# Patient Record
Sex: FEMALE | Race: WHITE | NOT HISPANIC OR LATINO | Employment: UNEMPLOYED | URBAN - METROPOLITAN AREA
[De-identification: names, ages, dates, MRNs, and addresses within clinical notes are randomized per-mention and may not be internally consistent; named-entity substitution may affect disease eponyms.]

---

## 2017-11-07 ENCOUNTER — TRANSCRIBE ORDERS (OUTPATIENT)
Dept: MAMMOGRAPHY | Facility: HOSPITAL | Age: 47
End: 2017-11-07

## 2017-11-07 DIAGNOSIS — Z12.31 VISIT FOR SCREENING MAMMOGRAM: Primary | ICD-10-CM

## 2017-11-09 ENCOUNTER — TRANSCRIBE ORDERS (OUTPATIENT)
Dept: ADMINISTRATIVE | Facility: HOSPITAL | Age: 47
End: 2017-11-09

## 2017-12-22 ENCOUNTER — TRANSCRIBE ORDERS (OUTPATIENT)
Dept: MAMMOGRAPHY | Facility: HOSPITAL | Age: 47
End: 2017-12-22

## 2017-12-22 DIAGNOSIS — Z12.31 VISIT FOR SCREENING MAMMOGRAM: Primary | ICD-10-CM

## 2018-01-11 ENCOUNTER — APPOINTMENT (OUTPATIENT)
Dept: MAMMOGRAPHY | Facility: HOSPITAL | Age: 48
End: 2018-01-11
Attending: OBSTETRICS & GYNECOLOGY

## 2018-02-09 ENCOUNTER — TRANSCRIBE ORDERS (OUTPATIENT)
Dept: MAMMOGRAPHY | Facility: HOSPITAL | Age: 48
End: 2018-02-09

## 2018-02-09 DIAGNOSIS — Z12.31 VISIT FOR SCREENING MAMMOGRAM: Primary | ICD-10-CM

## 2018-03-01 ENCOUNTER — HOSPITAL ENCOUNTER (OUTPATIENT)
Dept: MAMMOGRAPHY | Facility: HOSPITAL | Age: 48
Discharge: HOME | End: 2018-03-01
Attending: OBSTETRICS & GYNECOLOGY | Admitting: OBSTETRICS & GYNECOLOGY

## 2018-03-01 DIAGNOSIS — Z12.31 VISIT FOR SCREENING MAMMOGRAM: ICD-10-CM

## 2018-03-01 PROCEDURE — 77067 SCR MAMMO BI INCL CAD: CPT

## 2018-03-01 PROCEDURE — 77063 BREAST TOMOSYNTHESIS BI: CPT

## 2018-03-01 PROCEDURE — 77067 SCR MAMMO BI INCL CAD: CPT | Performed by: RADIOLOGY

## 2018-03-01 PROCEDURE — 77063 BREAST TOMOSYNTHESIS BI: CPT | Performed by: RADIOLOGY

## 2019-02-15 ENCOUNTER — TRANSCRIBE ORDERS (OUTPATIENT)
Dept: MAMMOGRAPHY | Facility: HOSPITAL | Age: 49
End: 2019-02-15

## 2019-02-15 DIAGNOSIS — Z12.31 VISIT FOR SCREENING MAMMOGRAM: Primary | ICD-10-CM

## 2019-03-07 ENCOUNTER — APPOINTMENT (OUTPATIENT)
Dept: MAMMOGRAPHY | Facility: HOSPITAL | Age: 49
End: 2019-03-07

## 2019-04-05 ENCOUNTER — APPOINTMENT (OUTPATIENT)
Dept: MAMMOGRAPHY | Facility: HOSPITAL | Age: 49
End: 2019-04-05

## 2019-04-23 ENCOUNTER — APPOINTMENT (OUTPATIENT)
Dept: MAMMOGRAPHY | Facility: HOSPITAL | Age: 49
End: 2019-04-23

## 2019-05-16 ENCOUNTER — APPOINTMENT (OUTPATIENT)
Dept: MAMMOGRAPHY | Facility: HOSPITAL | Age: 49
End: 2019-05-16

## 2019-05-17 ENCOUNTER — TRANSCRIBE ORDERS (OUTPATIENT)
Dept: MAMMOGRAPHY | Facility: HOSPITAL | Age: 49
End: 2019-05-17

## 2019-05-17 DIAGNOSIS — Z12.31 VISIT FOR SCREENING MAMMOGRAM: Primary | ICD-10-CM

## 2019-06-03 ENCOUNTER — HOSPITAL ENCOUNTER (OUTPATIENT)
Dept: MAMMOGRAPHY | Facility: HOSPITAL | Age: 49
Discharge: HOME | End: 2019-06-03
Admitting: OBSTETRICS & GYNECOLOGY

## 2019-06-03 DIAGNOSIS — Z12.31 VISIT FOR SCREENING MAMMOGRAM: ICD-10-CM

## 2019-06-03 PROCEDURE — 77067 SCR MAMMO BI INCL CAD: CPT | Performed by: RADIOLOGY

## 2019-06-03 PROCEDURE — 77063 BREAST TOMOSYNTHESIS BI: CPT | Performed by: RADIOLOGY

## 2019-06-03 PROCEDURE — 77063 BREAST TOMOSYNTHESIS BI: CPT

## 2019-06-03 PROCEDURE — 77067 SCR MAMMO BI INCL CAD: CPT

## 2020-07-13 ENCOUNTER — TRANSCRIBE ORDERS (OUTPATIENT)
Dept: MAMMOGRAPHY | Facility: HOSPITAL | Age: 50
End: 2020-07-13

## 2020-07-13 DIAGNOSIS — Z12.31 VISIT FOR SCREENING MAMMOGRAM: Primary | ICD-10-CM

## 2020-07-30 ENCOUNTER — HOSPITAL ENCOUNTER (EMERGENCY)
Facility: HOSPITAL | Age: 50
Discharge: HOME | End: 2020-07-30
Attending: EMERGENCY MEDICINE | Admitting: EMERGENCY MEDICINE

## 2020-07-30 ENCOUNTER — APPOINTMENT (OUTPATIENT)
Dept: CT IMAGING | Facility: HOSPITAL | Age: 50
End: 2020-07-30

## 2020-07-30 VITALS
SYSTOLIC BLOOD PRESSURE: 121 MMHG | HEIGHT: 62 IN | OXYGEN SATURATION: 99 % | BODY MASS INDEX: 32.2 KG/M2 | RESPIRATION RATE: 16 BRPM | WEIGHT: 175 LBS | HEART RATE: 84 BPM | DIASTOLIC BLOOD PRESSURE: 83 MMHG | TEMPERATURE: 97.6 F

## 2020-07-30 DIAGNOSIS — R19.03 ABDOMINAL MASS, RLQ (RIGHT LOWER QUADRANT): Primary | ICD-10-CM

## 2020-07-30 DIAGNOSIS — I10 ELEVATED BLOOD PRESSURE READING WITH DIAGNOSIS OF HYPERTENSION: ICD-10-CM

## 2020-07-30 DIAGNOSIS — D17.5 LIPOMA OF COLON: ICD-10-CM

## 2020-07-30 LAB
ALBUMIN SERPL-MCNC: 4.8 G/DL (ref 3.5–5.2)
ALBUMIN/GLOB SERPL: 1.4 G/DL
ALP SERPL-CCNC: 61 U/L (ref 39–117)
ALT SERPL W P-5'-P-CCNC: 28 U/L (ref 1–33)
ANION GAP SERPL CALCULATED.3IONS-SCNC: 13 MMOL/L (ref 5–15)
AST SERPL-CCNC: 30 U/L (ref 1–32)
BACTERIA UR QL AUTO: ABNORMAL /HPF
BASOPHILS # BLD AUTO: 0.03 10*3/MM3 (ref 0–0.2)
BASOPHILS NFR BLD AUTO: 0.3 % (ref 0–1.5)
BILIRUB SERPL-MCNC: 0.5 MG/DL (ref 0–1.2)
BILIRUB UR QL STRIP: NEGATIVE
BUN SERPL-MCNC: 8 MG/DL (ref 6–20)
BUN/CREAT SERPL: 8.6 (ref 7–25)
CALCIUM SPEC-SCNC: 9.6 MG/DL (ref 8.6–10.5)
CHLORIDE SERPL-SCNC: 100 MMOL/L (ref 98–107)
CLARITY UR: CLEAR
CO2 SERPL-SCNC: 23 MMOL/L (ref 22–29)
COLOR UR: YELLOW
CREAT SERPL-MCNC: 0.93 MG/DL (ref 0.57–1)
DEPRECATED RDW RBC AUTO: 42.2 FL (ref 37–54)
EOSINOPHIL # BLD AUTO: 0.17 10*3/MM3 (ref 0–0.4)
EOSINOPHIL NFR BLD AUTO: 1.8 % (ref 0.3–6.2)
ERYTHROCYTE [DISTWIDTH] IN BLOOD BY AUTOMATED COUNT: 12.7 % (ref 12.3–15.4)
GFR SERPL CREATININE-BSD FRML MDRD: 65 ML/MIN/1.73
GLOBULIN UR ELPH-MCNC: 3.4 GM/DL
GLUCOSE SERPL-MCNC: 93 MG/DL (ref 65–99)
GLUCOSE UR STRIP-MCNC: NEGATIVE MG/DL
HCT VFR BLD AUTO: 46.2 % (ref 34–46.6)
HGB BLD-MCNC: 15.3 G/DL (ref 12–15.9)
HGB UR QL STRIP.AUTO: NEGATIVE
HOLD SPECIMEN: NORMAL
HOLD SPECIMEN: NORMAL
HYALINE CASTS UR QL AUTO: ABNORMAL /LPF
IMM GRANULOCYTES # BLD AUTO: 0.02 10*3/MM3 (ref 0–0.05)
IMM GRANULOCYTES NFR BLD AUTO: 0.2 % (ref 0–0.5)
KETONES UR QL STRIP: ABNORMAL
LEUKOCYTE ESTERASE UR QL STRIP.AUTO: ABNORMAL
LIPASE SERPL-CCNC: 20 U/L (ref 13–60)
LYMPHOCYTES # BLD AUTO: 2.62 10*3/MM3 (ref 0.7–3.1)
LYMPHOCYTES NFR BLD AUTO: 28.1 % (ref 19.6–45.3)
MCH RBC QN AUTO: 29.9 PG (ref 26.6–33)
MCHC RBC AUTO-ENTMCNC: 33.1 G/DL (ref 31.5–35.7)
MCV RBC AUTO: 90.4 FL (ref 79–97)
MONOCYTES # BLD AUTO: 0.45 10*3/MM3 (ref 0.1–0.9)
MONOCYTES NFR BLD AUTO: 4.8 % (ref 5–12)
NEUTROPHILS NFR BLD AUTO: 6.03 10*3/MM3 (ref 1.7–7)
NEUTROPHILS NFR BLD AUTO: 64.8 % (ref 42.7–76)
NITRITE UR QL STRIP: NEGATIVE
NRBC BLD AUTO-RTO: 0 /100 WBC (ref 0–0.2)
PH UR STRIP.AUTO: 6 [PH] (ref 5–8)
PLATELET # BLD AUTO: 329 10*3/MM3 (ref 140–450)
PMV BLD AUTO: 10.5 FL (ref 6–12)
POTASSIUM SERPL-SCNC: 4.2 MMOL/L (ref 3.5–5.2)
PROT SERPL-MCNC: 8.2 G/DL (ref 6–8.5)
PROT UR QL STRIP: NEGATIVE
RBC # BLD AUTO: 5.11 10*6/MM3 (ref 3.77–5.28)
RBC # UR: ABNORMAL /HPF
REF LAB TEST METHOD: ABNORMAL
SODIUM SERPL-SCNC: 136 MMOL/L (ref 136–145)
SP GR UR STRIP: <=1.005 (ref 1–1.03)
SQUAMOUS #/AREA URNS HPF: ABNORMAL /HPF
UROBILINOGEN UR QL STRIP: ABNORMAL
WBC # BLD AUTO: 9.32 10*3/MM3 (ref 3.4–10.8)
WBC UR QL AUTO: ABNORMAL /HPF
WHOLE BLOOD HOLD SPECIMEN: NORMAL
WHOLE BLOOD HOLD SPECIMEN: NORMAL

## 2020-07-30 PROCEDURE — 99284 EMERGENCY DEPT VISIT MOD MDM: CPT

## 2020-07-30 PROCEDURE — 81001 URINALYSIS AUTO W/SCOPE: CPT | Performed by: EMERGENCY MEDICINE

## 2020-07-30 PROCEDURE — 74176 CT ABD & PELVIS W/O CONTRAST: CPT

## 2020-07-30 PROCEDURE — 85025 COMPLETE CBC W/AUTO DIFF WBC: CPT

## 2020-07-30 PROCEDURE — 25010000002 ONDANSETRON PER 1 MG: Performed by: EMERGENCY MEDICINE

## 2020-07-30 PROCEDURE — 83690 ASSAY OF LIPASE: CPT

## 2020-07-30 PROCEDURE — 96374 THER/PROPH/DIAG INJ IV PUSH: CPT

## 2020-07-30 PROCEDURE — 25010000002 KETOROLAC TROMETHAMINE PER 15 MG: Performed by: EMERGENCY MEDICINE

## 2020-07-30 PROCEDURE — 80053 COMPREHEN METABOLIC PANEL: CPT

## 2020-07-30 PROCEDURE — 96375 TX/PRO/DX INJ NEW DRUG ADDON: CPT

## 2020-07-30 RX ORDER — ONDANSETRON 2 MG/ML
4 INJECTION INTRAMUSCULAR; INTRAVENOUS ONCE
Status: COMPLETED | OUTPATIENT
Start: 2020-07-30 | End: 2020-07-30

## 2020-07-30 RX ORDER — KETOROLAC TROMETHAMINE 10 MG/1
10 TABLET, FILM COATED ORAL EVERY 6 HOURS PRN
Qty: 20 TABLET | Refills: 0 | Status: SHIPPED | OUTPATIENT
Start: 2020-07-30 | End: 2022-05-04

## 2020-07-30 RX ORDER — DICYCLOMINE HYDROCHLORIDE 10 MG/1
20 CAPSULE ORAL ONCE
Status: COMPLETED | OUTPATIENT
Start: 2020-07-30 | End: 2020-07-30

## 2020-07-30 RX ORDER — MORPHINE SULFATE 4 MG/ML
4 INJECTION, SOLUTION INTRAMUSCULAR; INTRAVENOUS ONCE
Status: DISCONTINUED | OUTPATIENT
Start: 2020-07-30 | End: 2020-07-30

## 2020-07-30 RX ORDER — SODIUM CHLORIDE 0.9 % (FLUSH) 0.9 %
10 SYRINGE (ML) INJECTION AS NEEDED
Status: DISCONTINUED | OUTPATIENT
Start: 2020-07-30 | End: 2020-07-30 | Stop reason: HOSPADM

## 2020-07-30 RX ORDER — KETOROLAC TROMETHAMINE 15 MG/ML
15 INJECTION, SOLUTION INTRAMUSCULAR; INTRAVENOUS ONCE
Status: COMPLETED | OUTPATIENT
Start: 2020-07-30 | End: 2020-07-30

## 2020-07-30 RX ADMIN — DICYCLOMINE HYDROCHLORIDE 20 MG: 10 CAPSULE ORAL at 15:15

## 2020-07-30 RX ADMIN — SODIUM CHLORIDE, POTASSIUM CHLORIDE, SODIUM LACTATE AND CALCIUM CHLORIDE 1000 ML: 600; 310; 30; 20 INJECTION, SOLUTION INTRAVENOUS at 15:19

## 2020-07-30 RX ADMIN — KETOROLAC TROMETHAMINE 15 MG: 15 INJECTION, SOLUTION INTRAMUSCULAR; INTRAVENOUS at 15:11

## 2020-07-30 RX ADMIN — ONDANSETRON 4 MG: 2 INJECTION INTRAMUSCULAR; INTRAVENOUS at 15:12

## 2020-07-31 ENCOUNTER — TELEPHONE (OUTPATIENT)
Dept: GASTROENTEROLOGY | Facility: CLINIC | Age: 50
End: 2020-07-31

## 2020-07-31 NOTE — TELEPHONE ENCOUNTER
Patient was seen in the ED last night and called today to schedule a follow-up.  Your first available was for August 24.  Can you please review her records and advise if she needs to be seen sooner?  Thanks!

## 2020-10-12 ENCOUNTER — OFFICE VISIT (OUTPATIENT)
Dept: GASTROENTEROLOGY | Facility: CLINIC | Age: 50
End: 2020-10-12

## 2020-10-12 VITALS
DIASTOLIC BLOOD PRESSURE: 80 MMHG | WEIGHT: 183.4 LBS | OXYGEN SATURATION: 98 % | BODY MASS INDEX: 33.75 KG/M2 | SYSTOLIC BLOOD PRESSURE: 112 MMHG | TEMPERATURE: 98 F | HEART RATE: 117 BPM | HEIGHT: 62 IN

## 2020-10-12 DIAGNOSIS — K59.00 CONSTIPATION, UNSPECIFIED CONSTIPATION TYPE: ICD-10-CM

## 2020-10-12 DIAGNOSIS — R10.31 RIGHT LOWER QUADRANT ABDOMINAL PAIN: Primary | ICD-10-CM

## 2020-10-12 DIAGNOSIS — R10.11 RIGHT UPPER QUADRANT ABDOMINAL PAIN: ICD-10-CM

## 2020-10-12 DIAGNOSIS — R14.0 BLOATING: ICD-10-CM

## 2020-10-12 PROCEDURE — 99204 OFFICE O/P NEW MOD 45 MIN: CPT | Performed by: INTERNAL MEDICINE

## 2020-10-12 NOTE — PROGRESS NOTES
PCP: Sly Fitzgerald MD    Chief Complaint   Patient presents with   • Follow-up     Hosp()   Follow-up from ER visit due to abdominal pain.    History of Present Illness:   HPI  Mrs. Purcell is a 49-year-old who presents for evaluation of abdominal pain.  The patient states that she has a history of Hashimoto's thyroid disease.  She has been on an anti-inflammatory diet for quite some time usually the Paleo diet.  She does admit that there are some foods that seem to cause her to have some abdominal discomfort.  However, she has experienced some pain in the right lower aspect that sometimes can involve the right middle abdomen.  She has felt in the past that it had characteristics of an ovarian cyst.  She had an EGD and colonoscopy performed by Dr. Marvin in [place], [place] about 2 years ago.  There were no abnormalities found at that time.  Mrs. Purcell describes the pain is been present several days a week.  The character can be crampy but sometimes sharp.  She denies any nausea or vomiting.  There is no history of unexplained weight loss.  Mrs. Purcell denies any blood in the stool.  There is no history of night sweats, fever or chills.  The patient denies any heartburn.  There is no history of difficult or painful swallowing.  Mrs. Purcell states that sometimes the discomfort can increase with certain positions or movement.  Past Medical History:   Diagnosis Date   • Hashimoto's disease        Past Surgical History:   Procedure Laterality Date   • COLONOSCOPY     • UPPER GASTROINTESTINAL ENDOSCOPY           Current Outpatient Medications:   •  hyoscyamine (LEVSIN) 0.125 MG SL tablet, Take 1 tablet by mouth Every 4 (Four) Hours As Needed for Cramping., Disp: 20 tablet, Rfl: 0  •  ketorolac (TORADOL) 10 MG tablet, Take 1 tablet by mouth Every 6 (Six) Hours As Needed for Moderate Pain . Received a dose in the ER., Disp: 20 tablet, Rfl: 0    Allergies   Allergen Reactions   • Penicillins Provider Review  Needed   • Sulfa Antibiotics Rash       Family History   Problem Relation Age of Onset   • Breast cancer Neg Hx    • Endometrial cancer Neg Hx    • Ovarian cancer Neg Hx        Social History     Socioeconomic History   • Marital status:      Spouse name: Not on file   • Number of children: Not on file   • Years of education: Not on file   • Highest education level: Not on file   Tobacco Use   • Smoking status: Never Smoker   Substance and Sexual Activity   • Alcohol use: Never     Frequency: Never   • Drug use: Never       Review of Systems   Constitutional: Negative for activity change, appetite change, fatigue, fever and unexpected weight change.   HENT: Negative for dental problem, hearing loss, mouth sores, postnasal drip, sneezing, trouble swallowing and voice change.    Eyes: Negative for pain, redness, itching and visual disturbance.   Respiratory: Negative for cough, choking, chest tightness, shortness of breath and wheezing.    Cardiovascular: Negative for chest pain, palpitations and leg swelling.   Gastrointestinal: Positive for abdominal distention (BLOATING), abdominal pain and constipation. Negative for anal bleeding, blood in stool, diarrhea, nausea, rectal pain and vomiting.   Endocrine: Negative for cold intolerance, heat intolerance, polydipsia, polyphagia and polyuria.   Genitourinary: Negative.  Negative for dysuria, enuresis, flank pain, hematuria and urgency.   Musculoskeletal: Negative for arthralgias, back pain, gait problem, joint swelling and myalgias.   Skin: Negative for color change, pallor and rash.   Allergic/Immunologic: Positive for food allergies. Negative for environmental allergies and immunocompromised state.   Neurological: Negative for dizziness, tremors, seizures, facial asymmetry, speech difficulty, numbness and headaches.   Hematological: Negative for adenopathy.   Psychiatric/Behavioral: Negative for behavioral problems, confusion, dysphoric mood, hallucinations and  self-injury.       Vitals:    10/12/20 1531   BP: 112/80   Pulse: 117   Temp: 98 °F (36.7 °C)   SpO2: 98%       Physical Exam  Vitals signs reviewed.   Constitutional:       General: She is not in acute distress.     Appearance: Normal appearance.   HENT:      Head: Normocephalic and atraumatic.      Nose: Nose normal.      Mouth/Throat:      Mouth: Mucous membranes are moist.      Pharynx: No oropharyngeal exudate.   Eyes:      General: No scleral icterus.     Extraocular Movements: Extraocular movements intact.   Neck:      Musculoskeletal: Normal range of motion. No neck rigidity.   Cardiovascular:      Rate and Rhythm: Normal rate and regular rhythm.      Heart sounds: No murmur. No gallop.    Pulmonary:      Effort: Pulmonary effort is normal.      Breath sounds: Normal breath sounds. No wheezing or rales.   Abdominal:      General: Abdomen is flat. Bowel sounds are normal.      Tenderness: There is abdominal tenderness (right lower quadrant). There is no guarding.   Musculoskeletal: Normal range of motion.         General: No swelling or tenderness.   Skin:     General: Skin is warm and dry.      Coloration: Skin is not jaundiced.   Neurological:      General: No focal deficit present.      Mental Status: She is alert and oriented to person, place, and time.      Motor: No weakness.   Psychiatric:         Mood and Affect: Mood normal.         Thought Content: Thought content normal.         Judgment: Judgment normal.         Paulina was seen today for follow-up.    Diagnoses and all orders for this visit:    Right lower quadrant abdominal pain  -     CT Abdomen Pelvis With & Without Contrast; Future    Right upper quadrant abdominal pain  -     CT Abdomen Pelvis With & Without Contrast; Future    Bloating  -     CT Abdomen Pelvis With & Without Contrast; Future    Constipation, unspecified constipation type  -     CT Abdomen Pelvis With & Without Contrast; Future    Patient has pain with examination in the  right lower quadrant and right mid abdomen.  The etiology is unclear but could involve musculoskeletal or GI relationship..  She does have a history of an autoimmune disorder and cannot exclude other gastrointestinal autoimmune diseases.  She reports that her diet daily is gluten-free.  The records from the previous upper and lower endoscopy were not available at time of this office visit.      Plan: Will schedule a CT scan of the abdomen and pelvis with oral and IV contrast.           Will ask for a copy of the previous upper endoscopy and colonoscopy reports.           Further recommendations pending imaging study.

## 2020-10-21 ENCOUNTER — HOSPITAL ENCOUNTER (OUTPATIENT)
Dept: CT IMAGING | Facility: HOSPITAL | Age: 50
Discharge: HOME | End: 2020-10-21
Admitting: INTERNAL MEDICINE

## 2020-10-21 DIAGNOSIS — R14.0 BLOATING: ICD-10-CM

## 2020-10-21 DIAGNOSIS — R10.31 RIGHT LOWER QUADRANT ABDOMINAL PAIN: ICD-10-CM

## 2020-10-21 DIAGNOSIS — K59.00 CONSTIPATION, UNSPECIFIED CONSTIPATION TYPE: ICD-10-CM

## 2020-10-21 DIAGNOSIS — R10.11 RIGHT UPPER QUADRANT ABDOMINAL PAIN: ICD-10-CM

## 2020-10-21 LAB — CREAT BLDA-MCNC: 0.9 MG/DL (ref 0.6–1.3)

## 2020-10-21 PROCEDURE — 25010000002 IOPAMIDOL 61 % SOLUTION: Performed by: INTERNAL MEDICINE

## 2020-10-21 PROCEDURE — 74178 CT ABD&PLV WO CNTR FLWD CNTR: CPT

## 2020-10-21 PROCEDURE — 82565 ASSAY OF CREATININE: CPT

## 2020-10-21 RX ADMIN — IOPAMIDOL 100 ML: 612 INJECTION, SOLUTION INTRAVENOUS at 14:37

## 2020-10-26 ENCOUNTER — TELEPHONE (OUTPATIENT)
Dept: GASTROENTEROLOGY | Facility: CLINIC | Age: 50
End: 2020-10-26

## 2020-10-26 NOTE — TELEPHONE ENCOUNTER
----- Message from Fredrick Santos MD sent at 10/22/2020  5:07 PM EDT -----  Let Ms. Purcell know the CT scan demonstrated a lipoma in the cecum.  This is a fatty growth in the cecum.  There is no evidence for a mass in the bowel.  Thank you,  JOSHUA

## 2020-10-30 ENCOUNTER — HOSPITAL ENCOUNTER (OUTPATIENT)
Dept: MAMMOGRAPHY | Facility: HOSPITAL | Age: 50
Discharge: HOME | End: 2020-10-30
Admitting: OBSTETRICS & GYNECOLOGY

## 2020-10-30 DIAGNOSIS — Z12.31 VISIT FOR SCREENING MAMMOGRAM: ICD-10-CM

## 2020-10-30 PROCEDURE — 77063 BREAST TOMOSYNTHESIS BI: CPT

## 2020-10-30 PROCEDURE — 77067 SCR MAMMO BI INCL CAD: CPT

## 2020-10-30 PROCEDURE — 77063 BREAST TOMOSYNTHESIS BI: CPT | Performed by: RADIOLOGY

## 2020-10-30 PROCEDURE — 77067 SCR MAMMO BI INCL CAD: CPT | Performed by: RADIOLOGY

## 2021-01-15 ENCOUNTER — OFFICE VISIT (OUTPATIENT)
Dept: OBSTETRICS AND GYNECOLOGY | Facility: CLINIC | Age: 51
End: 2021-01-15

## 2021-01-15 VITALS
BODY MASS INDEX: 32.25 KG/M2 | WEIGHT: 182 LBS | DIASTOLIC BLOOD PRESSURE: 82 MMHG | SYSTOLIC BLOOD PRESSURE: 118 MMHG | HEIGHT: 63 IN

## 2021-01-15 DIAGNOSIS — R63.5 WEIGHT GAIN: ICD-10-CM

## 2021-01-15 DIAGNOSIS — Z01.419 PAP TEST, AS PART OF ROUTINE GYNECOLOGICAL EXAMINATION: Primary | ICD-10-CM

## 2021-01-15 PROCEDURE — 99396 PREV VISIT EST AGE 40-64: CPT | Performed by: NURSE PRACTITIONER

## 2021-01-15 RX ORDER — THYROID,PORK 90 MG
TABLET ORAL
COMMUNITY
Start: 2021-01-06 | End: 2022-05-04 | Stop reason: SDUPTHER

## 2021-01-15 NOTE — PROGRESS NOTES
GYN Annual Exam     CC - Here for annual exam.        HPI  Paulina Purcell is a 50 y.o. female, , who presents for annual well woman exam. Patient's last menstrual period was 2021..  Periods are regular every 25-35 days, lasting 7 days. .  Dysmenorrhea:mild, occurring throughout menses.   There were no changes to her medical or surgical history since her last visit.. Partner Status: Marital Status: .  New Partners since last visit: no.  Desires STD Screening: no. Patient states periods changing slightly and c/o weight gain in mid section.     Additional OB/GYN History   Current contraception: contraceptive methods: Vasectomy   Desires to: do not start contraception  Last Pap :   Last Completed Pap Smear       Status Date      PAP SMEAR Done 6/3/2019 negative        History of abnormal Pap smear: no  Family history of uterine, colon, breast, or ovarian cancer: no  Performs monthly Self-Breast Exam: yes  Last mammogram:   Last Completed Mammogram       Status Date      MAMMOGRAM Done 10/30/2020 MAMMO SCREENING DIGITAL TOMOSYNTHESIS BILATERAL W CAD     Patient has more history with this topic...         Exercises Regularly: no  Feelings of Anxiety or Depression: no  Tobacco Usage?: No   OB History        3    Para   2    Term   2            AB   1    Living           SAB   1    TAB        Ectopic        Molar        Multiple        Live Births                    Health Maintenance   Topic Date Due   • Annual Gynecologic Pelvic and Breast Exam  1972   • COLONOSCOPY  1972   • ANNUAL PHYSICAL  1975   • HEPATITIS C SCREENING  2020   • PAP SMEAR  2020   • INFLUENZA VACCINE  2020   • MAMMOGRAM  10/30/2021   • TDAP/TD VACCINES (3 - Td) 02/15/2025   • Pneumococcal Vaccine 0-64  Aged Out   • MENINGOCOCCAL VACCINE  Aged Out       The additional following portions of the patient's history were reviewed and updated as appropriate: allergies, current medications,  "past family history, past medical history, past social history and past surgical history.    Review of Systems   Constitutional: Positive for unexpected weight gain. Negative for fatigue and fever.   HENT: Negative.    Respiratory: Negative.    Cardiovascular: Negative.    Gastrointestinal: Negative.    Genitourinary: Negative.    Musculoskeletal: Negative.    Skin: Negative.    Allergic/Immunologic: Negative.    Neurological: Negative.    Hematological: Negative.    Psychiatric/Behavioral: Negative.      All other systems reviewed and are negative.     I have reviewed and agree with the HPI, ROS, and historical information as entered above. Ely Rios Ambrosio, APRN    Objective   /82   Ht 160 cm (63\")   Wt 82.6 kg (182 lb)   LMP 01/07/2021   BMI 32.24 kg/m²     Physical Exam  Vitals signs and nursing note reviewed. Exam conducted with a chaperone present.   Constitutional:       Appearance: She is well-developed.   HENT:      Head: Normocephalic and atraumatic.   Neck:      Musculoskeletal: Normal range of motion. No muscular tenderness.      Thyroid: No thyroid mass or thyromegaly.   Pulmonary:      Effort: Pulmonary effort is normal. No retractions.   Chest:      Chest wall: No mass.      Breasts:         Right: Normal. No mass, nipple discharge, skin change or tenderness.         Left: Normal. No mass, nipple discharge, skin change or tenderness.   Abdominal:      Palpations: Abdomen is soft. Abdomen is not rigid. There is no mass.      Tenderness: There is no abdominal tenderness. There is no guarding.      Hernia: No hernia is present. There is no hernia in the left inguinal area.   Genitourinary:     General: Normal vulva.      Labia:         Right: No rash, tenderness or lesion.         Left: No rash, tenderness or lesion.       Vagina: Normal. No vaginal discharge or lesions.      Cervix: Normal.      Uterus: Normal. Not enlarged, not fixed and not tender.       Adnexa: Right adnexa normal and left " adnexa normal.        Right: No mass or tenderness.          Left: No mass or tenderness.        Rectum: No external hemorrhoid.   Skin:     General: Skin is warm and dry.   Neurological:      Mental Status: She is alert and oriented to person, place, and time.   Psychiatric:         Mood and Affect: Mood normal.         Behavior: Behavior normal.            Assessment and Plan    Problem List Items Addressed This Visit     None      Visit Diagnoses     Pap test, as part of routine gynecological examination    -  Primary    Relevant Orders    Pap IG, Rfx HPV ASCU    Weight gain              1. Reviewed monthly self breast exams.  Instructed to call with lumps, pain, or breast discharge.    2. Recommended use of Vitamin D replacement and getting adequate calcium in her diet. (1500mg)  3. Reviewed BMI and weight loss as preventative health measures.   4. Reviewed exercise as a preventative health measures.   5. Reccommended Flu Vaccine in Fall of each year.  6. Symptoms of menopausal transition reviewed with patient.   7. RTC in 1 year or PRN with problems.    Ely Valente, APRN  01/15/2021

## 2021-01-21 DIAGNOSIS — Z01.419 PAP TEST, AS PART OF ROUTINE GYNECOLOGICAL EXAMINATION: ICD-10-CM

## 2021-02-25 ENCOUNTER — TELEPHONE (OUTPATIENT)
Dept: OBSTETRICS AND GYNECOLOGY | Facility: CLINIC | Age: 51
End: 2021-02-25

## 2021-02-25 NOTE — TELEPHONE ENCOUNTER
Notified patient pap negative with +endometrial cells-patient is having regular periods.     Per OP this is WNL and ok to repeat in 1 year. Pt vu.

## 2021-05-21 RX ORDER — FLUCONAZOLE 150 MG/1
TABLET ORAL
Qty: 2 TABLET | Refills: 0 | Status: SHIPPED | OUTPATIENT
Start: 2021-05-21 | End: 2022-05-04

## 2022-05-02 ENCOUNTER — TELEPHONE (OUTPATIENT)
Dept: FAMILY MEDICINE CLINIC | Facility: CLINIC | Age: 52
End: 2022-05-02

## 2022-05-04 ENCOUNTER — OFFICE VISIT (OUTPATIENT)
Dept: FAMILY MEDICINE CLINIC | Facility: CLINIC | Age: 52
End: 2022-05-04

## 2022-05-04 VITALS
BODY MASS INDEX: 32.34 KG/M2 | SYSTOLIC BLOOD PRESSURE: 124 MMHG | HEART RATE: 97 BPM | WEIGHT: 182.5 LBS | OXYGEN SATURATION: 98 % | DIASTOLIC BLOOD PRESSURE: 72 MMHG | HEIGHT: 63 IN

## 2022-05-04 DIAGNOSIS — E03.9 ACQUIRED HYPOTHYROIDISM: Primary | ICD-10-CM

## 2022-05-04 PROCEDURE — 99213 OFFICE O/P EST LOW 20 MIN: CPT | Performed by: PHYSICIAN ASSISTANT

## 2022-05-04 RX ORDER — THYROID,PORK 90 MG
90 TABLET ORAL DAILY
Qty: 90 TABLET | Refills: 1 | Status: SHIPPED | OUTPATIENT
Start: 2022-05-04 | End: 2022-11-03

## 2022-05-04 RX ORDER — TRETINOIN 0.6 MG/G
GEL TOPICAL
COMMUNITY
Start: 2022-04-28

## 2022-05-04 RX ORDER — IVERMECTIN 10 MG/G
CREAM TOPICAL
COMMUNITY

## 2022-05-04 NOTE — ASSESSMENT & PLAN NOTE
I will refill her medication for another 6 months and she can schedule her next visit as an annual physical.

## 2022-05-04 NOTE — PROGRESS NOTES
"Chief Complaint  Hypothyroidism    Subjective          Paulina Purcell presents to [facility]  History of Present Illness patient comes in today needing her thyroid medication refilled she has no other complaints today and is feeling fine.    Objective     Vital Signs:   /72 (BP Location: Right arm, Patient Position: Sitting, Cuff Size: Large Adult)   Pulse 97   Ht 160 cm (63\")   Wt 82.8 kg (182 lb 8 oz)   SpO2 98%   BMI 32.33 kg/m²     Body mass index is 32.33 kg/m².    Review of Systems   Constitutional: Negative.    HENT: Negative.    Respiratory: Negative.    Cardiovascular: Negative.    Gastrointestinal: Negative.    Neurological: Negative.    Psychiatric/Behavioral: Negative.        Past History:  Medical History: has a past medical history of Hashimoto's disease, mammogram (03/02/2018), Hypothyroidism (acquired), Papanicolaou smear (03/01/2018), and Screening breast examination.   Surgical History: has a past surgical history that includes Colonoscopy; Upper gastrointestinal endoscopy; BIOPSY OF BACK/FLANK (2007); Diagnostic laparoscopy; and Cholecystectomy.   Family History: family history includes Diabetes in her maternal grandfather; Stroke in her father and mother; Thyroid disease in her father.   Social History: reports that she has never smoked. She has never used smokeless tobacco. She reports that she does not drink alcohol and does not use drugs.      Current Outpatient Medications:   •  [place] Thyroid 90 MG tablet, Take 1 tablet by mouth Daily., Disp: 90 tablet, Rfl: 1  •  Ivermectin 1 % cream, Apply  topically., Disp: , Rfl:   •  Retin-A Micro Pump 0.06 % gel, , Disp: , Rfl:     Allergies: Penicillins and Sulfa antibiotics    Physical Exam  Vitals reviewed.   Constitutional:       Appearance: Normal appearance.   Cardiovascular:      Rate and Rhythm: Normal rate and regular rhythm.      Heart sounds: Normal heart sounds.   Pulmonary:      Effort: Pulmonary " effort is normal.      Breath sounds: Normal breath sounds.   Abdominal:      General: Bowel sounds are normal.      Palpations: Abdomen is soft.   Musculoskeletal:         General: Normal range of motion.   Neurological:      General: No focal deficit present.      Mental Status: She is alert and oriented to person, place, and time.   Psychiatric:         Mood and Affect: Mood normal.             Assessment and Plan   Diagnoses and all orders for this visit:    1. Acquired hypothyroidism (Primary)  Assessment & Plan:  I will refill her medication for another 6 months and she can schedule her next visit as an annual physical.       Other orders  -     [place] Thyroid 90 MG tablet; Take 1 tablet by mouth Daily.  Dispense: 90 tablet; Refill: 1          Follow Up   Return in about 6 months (around 11/4/2022) for Annual physical.  Patient was given instructions and counseling regarding her condition or for health maintenance advice. Please see specific information pulled into the AVS if appropriate.     Alyssa Arriola PA-C

## 2022-10-31 ENCOUNTER — TELEPHONE (OUTPATIENT)
Dept: FAMILY MEDICINE CLINIC | Facility: CLINIC | Age: 52
End: 2022-10-31

## 2022-11-01 NOTE — TELEPHONE ENCOUNTER
Caller: Paulina Purcell    Relationship: Self    Best call back number: [phone]     What was the call regarding: PATIENT CALLED TO CHECK THE STATUS OF HER PRESCRIPTION REFILL REQUEST.  PATIENT STATED THAT SHE IS OUT OF MEDICATION.    Do you require a callback:YES

## 2022-11-02 RX ORDER — THYROID,PORK 90 MG
90 TABLET ORAL DAILY
Qty: 90 TABLET | Refills: 1 | OUTPATIENT
Start: 2022-11-02

## 2022-11-02 NOTE — TELEPHONE ENCOUNTER
Patient was informed that her medication request was denied. She said that when she called on Monday, 10/31/22, she spoke to shena Harrison and was told that her medication would be sent to the pharmacy.  She had to rescheduled her 11/4/2022 appt due to a time conflict.  She said that she called again and was told that this would be taken care of.  She said that she has been without medication for a few days and she's not supposed to miss a day.  She would like a call back. Ph: [phone]

## 2022-11-03 RX ORDER — THYROID,PORK 90 MG
TABLET ORAL
Qty: 90 TABLET | Refills: 0 | Status: SHIPPED | OUTPATIENT
Start: 2022-11-03 | End: 2022-12-12 | Stop reason: SDUPTHER

## 2022-11-21 DIAGNOSIS — Z13.1 DIABETES MELLITUS SCREENING: ICD-10-CM

## 2022-11-21 DIAGNOSIS — Z00.00 GENERAL MEDICAL EXAM: ICD-10-CM

## 2022-11-21 DIAGNOSIS — E03.9 ACQUIRED HYPOTHYROIDISM: Primary | ICD-10-CM

## 2022-12-12 ENCOUNTER — OFFICE VISIT (OUTPATIENT)
Dept: FAMILY MEDICINE CLINIC | Facility: CLINIC | Age: 52
End: 2022-12-12

## 2022-12-12 VITALS
BODY MASS INDEX: 32.25 KG/M2 | DIASTOLIC BLOOD PRESSURE: 84 MMHG | HEIGHT: 63 IN | HEART RATE: 88 BPM | OXYGEN SATURATION: 98 % | WEIGHT: 182 LBS | SYSTOLIC BLOOD PRESSURE: 122 MMHG

## 2022-12-12 DIAGNOSIS — E03.9 HYPOTHYROIDISM (ACQUIRED): ICD-10-CM

## 2022-12-12 DIAGNOSIS — L71.9 ROSACEA: ICD-10-CM

## 2022-12-12 DIAGNOSIS — G47.00 INSOMNIA, PERSISTENT: ICD-10-CM

## 2022-12-12 DIAGNOSIS — Z00.00 GENERAL MEDICAL EXAM: Primary | ICD-10-CM

## 2022-12-12 PROBLEM — Z13.1 DIABETES MELLITUS SCREENING: Status: ACTIVE | Noted: 2022-12-12

## 2022-12-12 PROBLEM — Z13.1 DIABETES MELLITUS SCREENING: Status: RESOLVED | Noted: 2022-12-12 | Resolved: 2022-12-12

## 2022-12-12 PROBLEM — Z12.11 SCREENING FOR COLON CANCER: Status: RESOLVED | Noted: 2022-12-12 | Resolved: 2022-12-12

## 2022-12-12 PROBLEM — Z12.11 SCREENING FOR COLON CANCER: Status: ACTIVE | Noted: 2022-12-12

## 2022-12-12 PROCEDURE — 99396 PREV VISIT EST AGE 40-64: CPT | Performed by: FAMILY MEDICINE

## 2022-12-12 RX ORDER — THYROID,PORK 90 MG
90 TABLET ORAL DAILY
Qty: 90 TABLET | Refills: 3 | Status: SHIPPED | OUTPATIENT
Start: 2022-12-12

## 2022-12-12 NOTE — ASSESSMENT & PLAN NOTE
We did discuss her current over-the-counter treatments.    I encouraged her to build into her schedule exercise daily to help with her BMI and also good sleep hygiene.    We did discuss trazodone as an option for prescription treatment but she wants to hold off on prescription meds at this time

## 2022-12-12 NOTE — PROGRESS NOTES
"Due forChief Complaint  thyroid (Rx refill )    Subjective    History of Present Illness:  Paulina Purcell is a 51 y.o. female who presents today for physical exam and medication refills.    She continues on [place] Thyroid for hypothyroidism.  Failed Synthroid.  She will return to get fasting blood work up-to-date including up-to-date thyroid studies.    Up-to-date with Pap January 2021    Discussed colon cancer screening given new recommendations for colonoscopy at age 45.  She is already up-to-date with colonoscopy in 2018 without polyps.  Plans to repeat colonoscopy in 2028.    Mammogram and plans to get that up-to-date.  She prefers to have that done through her gynecologist-I did encourage getting that scheduled today.    On topical treatment from dermatology for rosacea.    She has been under a lot of stress taking care of her aging parents along with home schooling.  She has noticed some difficulties falling asleep and is using melatonin with CBD oil.  We did discuss increasing her exercise to help with her BMI and also help with good sleep hygiene and chronic insomnia.    She wants to hold off on prescription medications at this time.    Declines COVID vaccination    Will consider Tdap at follow-up.  Were currently out of Tdap boosters      Objective   Vital Signs:   /84 (BP Location: Left arm, Patient Position: Sitting, Cuff Size: Adult)   Pulse 88   Ht 160 cm (63\")   Wt 82.6 kg (182 lb)   SpO2 98%   BMI 32.24 kg/m²     Review of Systems   Constitutional: Negative for appetite change, chills and fever.   HENT: Negative for hearing loss.    Eyes: Negative for blurred vision.   Respiratory: Negative for chest tightness.    Cardiovascular: Negative for chest pain.   Gastrointestinal: Negative for abdominal pain.   Musculoskeletal: Negative for gait problem.   Skin: Negative for rash.   Psychiatric/Behavioral: Positive for sleep disturbance. Negative for depressed mood.       Past History:  Medical " History: has a past medical history of Hashimoto's disease, mammogram (03/02/2018), Hypothyroidism (acquired), Papanicolaou smear (03/01/2018), and Screening breast examination.   Surgical History: has a past surgical history that includes Colonoscopy; Upper gastrointestinal endoscopy; BIOPSY OF BACK/FLANK (2007); Diagnostic laparoscopy; and Cholecystectomy.   Family History: family history includes Diabetes in her maternal grandfather; Stroke in her father and mother; Thyroid disease in her father.   Social History: reports that she has never smoked. She has never used smokeless tobacco. She reports that she does not drink alcohol and does not use drugs.      Current Outpatient Medications:   •  [place] Thyroid 90 MG tablet, Take 1 tablet by mouth Daily., Disp: 90 tablet, Rfl: 3  •  Ivermectin 1 % cream, Apply  topically., Disp: , Rfl:   •  Retin-A Micro Pump 0.06 % gel, , Disp: , Rfl:     Allergies: Penicillins, Cefuroxime axetil, and Sulfa antibiotics    Physical Exam  Constitutional:       Appearance: Normal appearance.   HENT:      Head: Normocephalic.      Right Ear: External ear normal.      Left Ear: External ear normal.      Nose: Nose normal.   Eyes:      Pupils: Pupils are equal, round, and reactive to light.   Cardiovascular:      Rate and Rhythm: Normal rate and regular rhythm.      Heart sounds: Normal heart sounds.   Pulmonary:      Effort: Pulmonary effort is normal.      Breath sounds: Normal breath sounds.   Musculoskeletal:         General: Normal range of motion.      Cervical back: Normal range of motion.   Skin:     General: Skin is warm and dry.   Neurological:      General: No focal deficit present.      Mental Status: She is alert.   Psychiatric:         Mood and Affect: Mood normal.         Behavior: Behavior normal.         Thought Content: Thought content normal.          Result Review                   Assessment and Plan  Diagnoses and all orders for this visit:    1. General medical  exam (Primary)  Assessment & Plan:  Reviewed together health maintenance, screening, and vaccination options.    We did address new recommendations to start colon cancer screening at age 45.  She had this up-to-date in 2018.  Updated in chart.  Due for repeat in 2028.    Declines COVID vaccination    Encouraged Tdap    Awaiting return for fasting labs.    Refilled [facility] Thyroid    Encouraged past-due mammogram      2. Hypothyroidism (acquired)  Assessment & Plan:  Doing well on [place] Thyroid.  Refilled.  Awaiting recheck on fasting labs    Orders:  -     [place] Thyroid 90 MG tablet; Take 1 tablet by mouth Daily.  Dispense: 90 tablet; Refill: 3    3. Insomnia, persistent  Assessment & Plan:  We did discuss her current over-the-counter treatments.    I encouraged her to build into her schedule exercise daily to help with her BMI and also good sleep hygiene.    We did discuss trazodone as an option for prescription treatment but she wants to hold off on prescription meds at this time      4. Rosacea  Assessment & Plan:  Continue with current topical treatment from dermatology        BMI is >= 30 and <35. (Class 1 Obesity). The following options were offered after discussion;: exercise counseling/recommendations and nutrition counseling/recommendations          Follow Up  Return in about 1 year (around 12/12/2023) for Annual physical.    Sly Fitzgerald MD  Answers for HPI/ROS submitted by the patient on 12/12/2022  Please describe your symptoms.: I just need my annual labs done  Have you had these symptoms before?: Yes  How long have you been having these symptoms?: Greater than 2 weeks  Please list any medications you are currently taking for this condition.: [facility] Thyroid  What is the primary reason for your visit?: Other

## 2022-12-12 NOTE — ASSESSMENT & PLAN NOTE
Reviewed together health maintenance, screening, and vaccination options.    We did address new recommendations to start colon cancer screening at age 45.  She had this up-to-date in 2018.  Updated in chart.  Due for repeat in 2028.    Declines COVID vaccination    Encouraged Tdap    Awaiting return for fasting labs.    Refilled [place] Thyroid    Encouraged past-due mammogram

## 2023-07-27 ENCOUNTER — OFFICE VISIT (OUTPATIENT)
Dept: FAMILY MEDICINE CLINIC | Facility: CLINIC | Age: 53
End: 2023-07-27
Payer: COMMERCIAL

## 2023-07-27 VITALS
BODY MASS INDEX: 32.44 KG/M2 | WEIGHT: 183.1 LBS | SYSTOLIC BLOOD PRESSURE: 130 MMHG | OXYGEN SATURATION: 99 % | DIASTOLIC BLOOD PRESSURE: 84 MMHG | HEIGHT: 63 IN | HEART RATE: 105 BPM

## 2023-07-27 DIAGNOSIS — G47.00 INSOMNIA, PERSISTENT: ICD-10-CM

## 2023-07-27 DIAGNOSIS — Z23 NEED FOR TDAP VACCINATION: ICD-10-CM

## 2023-07-27 DIAGNOSIS — E66.09 CLASS 1 OBESITY DUE TO EXCESS CALORIES WITH SERIOUS COMORBIDITY AND BODY MASS INDEX (BMI) OF 32.0 TO 32.9 IN ADULT: ICD-10-CM

## 2023-07-27 DIAGNOSIS — Z12.31 SCREENING MAMMOGRAM FOR BREAST CANCER: ICD-10-CM

## 2023-07-27 DIAGNOSIS — Z00.00 GENERAL MEDICAL EXAM: ICD-10-CM

## 2023-07-27 DIAGNOSIS — E03.9 HYPOTHYROIDISM (ACQUIRED): Primary | Chronic | ICD-10-CM

## 2023-07-27 DIAGNOSIS — E03.9 ACQUIRED HYPOTHYROIDISM: ICD-10-CM

## 2023-07-27 DIAGNOSIS — Z13.1 DIABETES MELLITUS SCREENING: ICD-10-CM

## 2023-07-27 DIAGNOSIS — L71.9 ROSACEA: ICD-10-CM

## 2023-07-27 PROBLEM — E66.811 CLASS 1 OBESITY DUE TO EXCESS CALORIES WITH SERIOUS COMORBIDITY AND BODY MASS INDEX (BMI) OF 32.0 TO 32.9 IN ADULT: Status: ACTIVE | Noted: 2023-07-27

## 2023-07-27 RX ORDER — THYROID,PORK 90 MG
90 TABLET ORAL DAILY
Qty: 90 TABLET | Refills: 3 | Status: SHIPPED | OUTPATIENT
Start: 2023-07-27

## 2023-07-27 NOTE — ASSESSMENT & PLAN NOTE
Patient's (Body mass index is 32.43 kg/m².) indicates that they are obese (BMI >30) with health conditions that include none . Weight is unchanged. BMI  is above average; BMI management plan is completed. We discussed portion control and increasing exercise.

## 2023-07-27 NOTE — PROGRESS NOTES
"Chief Complaint  Med Refill    Subjective    History of Present Illness:  Paulina Purcell is a 52 y.o. female who presents today for 6-month follow-up visit and medication refills.    Doing well since our last visit for physical exam together in December.     She continues on [place] Thyroid for hypothyroidism.  Failed Synthroid.     Up-to-date with Pap January 2021     Colon cancer screening up-to-date with colonoscopy in 2018 without polyps.  Plans to repeat colonoscopy in 2028.     Mammogram remains past due for my records.  We did discuss this today and she would like to have this done at [facility] and she will call and get her appointment scheduled.       On topical treatment from dermatology for rosacea.     Declines COVID vaccination     Discussed Tdap and Shingrix.  Declines vaccination update today     Objective   Vital Signs:   /84   Pulse 105   Ht 160 cm (63\")   Wt 83.1 kg (183 lb 1.6 oz)   SpO2 99%   BMI 32.43 kg/m²     Review of Systems   Constitutional:  Negative for appetite change, chills and fever.   HENT:  Negative for hearing loss.    Eyes:  Negative for blurred vision.   Respiratory:  Negative for chest tightness.    Cardiovascular:  Negative for chest pain.   Gastrointestinal:  Negative for abdominal pain.   Musculoskeletal:  Negative for gait problem.   Skin:  Negative for rash.   Psychiatric/Behavioral:  Negative for depressed mood.      Past History:  Medical History: has a past medical history of Hashimoto's disease, mammogram (03/02/2018), Hypothyroidism (acquired), Papanicolaou smear (03/01/2018), and Screening breast examination.   Surgical History: has a past surgical history that includes Colonoscopy; Upper gastrointestinal endoscopy; BIOPSY OF BACK/FLANK (2007); Diagnostic laparoscopy; and Cholecystectomy.   Family History: family history includes Diabetes in her maternal grandfather; Stroke in her father and mother; Thyroid disease in her father. "   Social History: reports that she has never smoked. She has never used smokeless tobacco. She reports that she does not drink alcohol and does not use drugs.      Current Outpatient Medications:     [place] Thyroid 90 MG tablet, Take 1 tablet by mouth Daily., Disp: 90 tablet, Rfl: 3    Ivermectin 1 % cream, Apply  topically., Disp: , Rfl:     Retin-A Micro Pump 0.06 % gel, , Disp: , Rfl:     Allergies: Penicillins, Cefuroxime axetil, and Sulfa antibiotics    Physical Exam  Constitutional:       Appearance: Normal appearance.   HENT:      Head: Normocephalic.      Right Ear: External ear normal.      Left Ear: External ear normal.      Nose: Nose normal.   Eyes:      Pupils: Pupils are equal, round, and reactive to light.   Cardiovascular:      Rate and Rhythm: Normal rate and regular rhythm.      Heart sounds: Normal heart sounds.   Pulmonary:      Effort: Pulmonary effort is normal.      Breath sounds: Normal breath sounds.   Musculoskeletal:         General: Normal range of motion.      Cervical back: Normal range of motion.   Skin:     General: Skin is warm and dry.   Neurological:      General: No focal deficit present.      Mental Status: She is alert.   Psychiatric:         Mood and Affect: Mood normal.         Behavior: Behavior normal.         Thought Content: Thought content normal.        Result Review                   Assessment and Plan  Diagnoses and all orders for this visit:    1. Hypothyroidism (acquired) (Primary)  Assessment & Plan:  Doing well on [place] Thyroid.  Refilled.  Awaiting recheck on fasting labs    Orders:  -     [place] Thyroid 90 MG tablet; Take 1 tablet by mouth Daily.  Dispense: 90 tablet; Refill: 3    2. Insomnia, persistent  Assessment & Plan:  Stable control without medications at this time.  We will continue to follow      3. Rosacea  Assessment & Plan:  Continue treatment from dermatology      4. Need for Tdap vaccination  Assessment & Plan:  Encouraged Tdap.  Patient  declines      5. Screening mammogram for breast cancer  Assessment & Plan:  Encouraged screening mammogram which is past due      6. Class 1 obesity due to excess calories with serious comorbidity and body mass index (BMI) of 32.0 to 32.9 in adult  Assessment & Plan:  Patient's (Body mass index is 32.43 kg/m².) indicates that they are obese (BMI >30) with health conditions that include none . Weight is unchanged. BMI  is above average; BMI management plan is completed. We discussed portion control and increasing exercise.                     Follow Up  Return in about 1 year (around 7/27/2024) for Annual physical, Fasting for labs at appointment (but drink water!).    Sly Fitzgerald MD

## 2023-07-28 LAB
ALBUMIN SERPL-MCNC: 4.9 G/DL (ref 3.9–4.9)
ALBUMIN/GLOB SERPL: 1.8 {RATIO} (ref 1.2–2.2)
ALP SERPL-CCNC: 76 IU/L (ref 44–121)
ALT SERPL-CCNC: 23 IU/L (ref 0–32)
AST SERPL-CCNC: 28 IU/L (ref 0–40)
BASOPHILS # BLD AUTO: 0.1 X10E3/UL (ref 0–0.2)
BASOPHILS NFR BLD AUTO: 0 %
BILIRUB SERPL-MCNC: 0.5 MG/DL (ref 0–1.2)
BUN SERPL-MCNC: 11 MG/DL (ref 6–24)
BUN/CREAT SERPL: 11 (ref 9–23)
CALCIUM SERPL-MCNC: 9.7 MG/DL (ref 8.7–10.2)
CHLORIDE SERPL-SCNC: 104 MMOL/L (ref 96–106)
CHOLEST SERPL-MCNC: 211 MG/DL (ref 100–199)
CO2 SERPL-SCNC: NORMAL MMOL/L
CREAT SERPL-MCNC: 1 MG/DL (ref 0.57–1)
EGFRCR SERPLBLD CKD-EPI 2021: 69 ML/MIN/1.73
EOSINOPHIL # BLD AUTO: 0.6 X10E3/UL (ref 0–0.4)
EOSINOPHIL NFR BLD AUTO: 5 %
ERYTHROCYTE [DISTWIDTH] IN BLOOD BY AUTOMATED COUNT: 13.5 % (ref 11.7–15.4)
GLOBULIN SER CALC-MCNC: 2.8 G/DL (ref 1.5–4.5)
GLUCOSE SERPL-MCNC: 95 MG/DL (ref 70–99)
HBA1C MFR BLD: 5.2 % (ref 4.8–5.6)
HCT VFR BLD AUTO: 49.8 % (ref 34–46.6)
HDLC SERPL-MCNC: 63 MG/DL
HGB BLD-MCNC: 16.2 G/DL (ref 11.1–15.9)
IMM GRANULOCYTES # BLD AUTO: 0 X10E3/UL (ref 0–0.1)
IMM GRANULOCYTES NFR BLD AUTO: 0 %
LDLC SERPL CALC-MCNC: 125 MG/DL (ref 0–99)
LYMPHOCYTES # BLD AUTO: 2.6 X10E3/UL (ref 0.7–3.1)
LYMPHOCYTES NFR BLD AUTO: 22 %
MCH RBC QN AUTO: 30 PG (ref 26.6–33)
MCHC RBC AUTO-ENTMCNC: 32.5 G/DL (ref 31.5–35.7)
MCV RBC AUTO: 92 FL (ref 79–97)
MONOCYTES # BLD AUTO: 0.5 X10E3/UL (ref 0.1–0.9)
MONOCYTES NFR BLD AUTO: 4 %
NEUTROPHILS # BLD AUTO: 7.7 X10E3/UL (ref 1.4–7)
NEUTROPHILS NFR BLD AUTO: 69 %
PLATELET # BLD AUTO: 294 X10E3/UL (ref 150–450)
POTASSIUM SERPL-SCNC: 4.8 MMOL/L (ref 3.5–5.2)
PROT SERPL-MCNC: 7.7 G/DL (ref 6–8.5)
RBC # BLD AUTO: 5.4 X10E6/UL (ref 3.77–5.28)
SODIUM SERPL-SCNC: 137 MMOL/L (ref 134–144)
T4 FREE SERPL-MCNC: 1 NG/DL (ref 0.82–1.77)
TRIGL SERPL-MCNC: 128 MG/DL (ref 0–149)
TSH SERPL DL<=0.005 MIU/L-ACNC: 0.63 UIU/ML (ref 0.45–4.5)
VLDLC SERPL CALC-MCNC: 23 MG/DL (ref 5–40)
WBC # BLD AUTO: 11.5 X10E3/UL (ref 3.4–10.8)

## 2024-07-30 ENCOUNTER — TELEPHONE (OUTPATIENT)
Dept: FAMILY MEDICINE CLINIC | Facility: CLINIC | Age: 54
End: 2024-07-30

## 2024-07-30 NOTE — TELEPHONE ENCOUNTER
Caller: Paulina Purcell    Relationship: Self    Best call back number: [phone]        What orders are you requesting (i.e. lab or imaging): LABS FOR PHYSICAL (TO INCLUDE CHECKING THYROID LEVELS)     In what timeframe would the patient need to come in: PRIOR TO UPCOMING PHYSICAL ON 10/07/2024 WITH [facility]     Where will you receive your lab/imaging services: AT THE PRACTICE     Additional notes: PLEASE CALL THE PATIENT AND ADVISE WHEN LABS ARE IN THE SYSTEM.

## 2024-07-30 NOTE — TELEPHONE ENCOUNTER
JUST AN FYI THAT THE PATIENT HAS AN APPOINTMENT FOR HER ANNUAL PHYSICAL WITH [facility] OF DR CEDILLO DUE TO AVAILABILITY OF APPOINTMENTS IN THE PATIENT'S PREFERRED TIME FRAME (THIS NOTE REQUIRED PER HUB WORKFLOW).

## 2024-07-31 DIAGNOSIS — E03.9 HYPOTHYROIDISM (ACQUIRED): Chronic | ICD-10-CM

## 2024-07-31 RX ORDER — THYROID,PORK 90 MG
90 TABLET ORAL DAILY
Qty: 90 TABLET | Refills: 0 | Status: SHIPPED | OUTPATIENT
Start: 2024-07-31

## 2024-08-07 ENCOUNTER — OFFICE VISIT (OUTPATIENT)
Dept: FAMILY MEDICINE CLINIC | Facility: CLINIC | Age: 54
End: 2024-08-07
Payer: COMMERCIAL

## 2024-08-07 VITALS
DIASTOLIC BLOOD PRESSURE: 76 MMHG | BODY MASS INDEX: 32.48 KG/M2 | TEMPERATURE: 97.9 F | HEART RATE: 83 BPM | SYSTOLIC BLOOD PRESSURE: 118 MMHG | HEIGHT: 63 IN | WEIGHT: 183.3 LBS | OXYGEN SATURATION: 98 %

## 2024-08-07 DIAGNOSIS — E03.9 HYPOTHYROIDISM (ACQUIRED): Chronic | ICD-10-CM

## 2024-08-07 DIAGNOSIS — J20.9 ACUTE BRONCHITIS WITH BRONCHOSPASM: Primary | ICD-10-CM

## 2024-08-07 PROCEDURE — 99214 OFFICE O/P EST MOD 30 MIN: CPT | Performed by: PHYSICIAN ASSISTANT

## 2024-08-07 RX ORDER — THYROID,PORK 90 MG
90 TABLET ORAL DAILY
Qty: 90 TABLET | Refills: 1 | Status: SHIPPED | OUTPATIENT
Start: 2024-08-07

## 2024-08-07 RX ORDER — DOXYCYCLINE 100 MG/1
100 CAPSULE ORAL 2 TIMES DAILY
Qty: 20 CAPSULE | Refills: 0 | Status: SHIPPED | OUTPATIENT
Start: 2024-08-07

## 2024-08-07 RX ORDER — METHYLPREDNISOLONE 4 MG/1
TABLET ORAL
Qty: 21 TABLET | Refills: 0 | Status: SHIPPED | OUTPATIENT
Start: 2024-08-07

## 2024-08-07 RX ORDER — ALBUTEROL SULFATE 90 UG/1
AEROSOL, METERED RESPIRATORY (INHALATION)
COMMUNITY
Start: 2024-07-20

## 2024-08-07 RX ORDER — BENZONATATE 200 MG/1
200 CAPSULE ORAL 3 TIMES DAILY PRN
Qty: 30 CAPSULE | Refills: 1 | Status: SHIPPED | OUTPATIENT
Start: 2024-08-07

## 2024-08-07 NOTE — PROGRESS NOTES
"Chief Complaint  Cough (X1.5 months) and Hypothyroidism (Pt needs a refill on her med)    Subjective          Paulina Purcell presents to [facility]    History of Present Illness patient is here today for a cough that has been going on for about a month and a half and she has been to urgent care and the [facility] clinic and they have given her a steroid pack which helped a little bit some Tessalon Perles that were helpful for the cough and an inhaler.  Nothing really seems to get rid of this \"bronchitis.\"  She also needs her thyroid medication refilled while she is here today.    Objective   Vital Signs:   /76 (BP Location: Right arm, Patient Position: Sitting, Cuff Size: Adult)   Pulse 83   Temp 97.9 °F (36.6 °C)   Ht 160 cm (63\")   Wt 83.1 kg (183 lb 4.8 oz)   SpO2 98%   BMI 32.47 kg/m²     Body mass index is 32.47 kg/m².    Review of Systems   Constitutional:  Negative for chills, fatigue and fever.   HENT:  Positive for congestion and postnasal drip. Negative for ear pain, rhinorrhea, sinus pressure and sore throat.    Respiratory:  Positive for cough and wheezing. Negative for chest tightness and shortness of breath.    Cardiovascular:  Negative for chest pain and palpitations.   Musculoskeletal:  Negative for back pain and myalgias.   Neurological:  Negative for dizziness and headache.   Psychiatric/Behavioral:  Negative for depressed mood. The patient is not nervous/anxious.        Past History:  Medical History: has a past medical history of Hashimoto's disease, mammogram (03/02/2018), Hypothyroidism (acquired), Papanicolaou smear (03/01/2018), and Screening breast examination.   Surgical History: has a past surgical history that includes Colonoscopy; Upper gastrointestinal endoscopy; BIOPSY OF BACK/FLANK (2007); Diagnostic laparoscopy; and Cholecystectomy.   Family History: family history includes Diabetes in her maternal grandfather; Stroke in her father and mother; " Thyroid disease in her father.   Social History: reports that she has never smoked. She has never used smokeless tobacco. She reports that she does not drink alcohol and does not use drugs.      Current Outpatient Medications:     albuterol sulfate  (90 Base) MCG/ACT inhaler, , Disp: , Rfl:     [place] Thyroid 90 MG tablet, Take 1 tablet by mouth Daily., Disp: 90 tablet, Rfl: 1    Ivermectin 1 % cream, Apply  topically., Disp: , Rfl:     Retin-A Micro Pump 0.06 % gel, , Disp: , Rfl:     benzonatate (TESSALON) 200 MG capsule, Take 1 capsule by mouth 3 (Three) Times a Day As Needed for Cough., Disp: 30 capsule, Rfl: 1    doxycycline (MONODOX) 100 MG capsule, Take 1 capsule by mouth 2 (Two) Times a Day., Disp: 20 capsule, Rfl: 0    methylPREDNISolone (MEDROL) 4 MG dose pack, Take as directed on package instructions., Disp: 21 tablet, Rfl: 0    Allergies: Penicillins, Cefuroxime axetil, and Sulfa antibiotics    Physical Exam  Constitutional:       Appearance: Normal appearance.   HENT:      Right Ear: Tympanic membrane, ear canal and external ear normal.      Left Ear: Tympanic membrane, ear canal and external ear normal.   Cardiovascular:      Rate and Rhythm: Normal rate and regular rhythm.      Heart sounds: Normal heart sounds. No murmur heard.  Pulmonary:      Effort: No respiratory distress.      Breath sounds: Wheezing and rhonchi present.   Neurological:      Mental Status: She is alert and oriented to person, place, and time.   Psychiatric:         Behavior: Behavior normal.          Result Review :                   Assessment and Plan    Diagnoses and all orders for this visit:    1. Acute bronchitis with bronchospasm (Primary)  Assessment & Plan:  She will continue using her albuterol inhaler as needed I did do another round of the steroids and put her on doxycycline and refilled the Tessalon Perles and hopefully that will clear this up.      2. Hypothyroidism (acquired)  Assessment & Plan:  I did  refill her medication to and will get her TSH and free T4 but she will need to follow-up with Dr. Fitzgerald for her annual physical.    Orders:  -     TSH; Future  -     T4, free; Future  -     [place] Thyroid 90 MG tablet; Take 1 tablet by mouth Daily.  Dispense: 90 tablet; Refill: 1  -     TSH  -     T4, free    Other orders  -     benzonatate (TESSALON) 200 MG capsule; Take 1 capsule by mouth 3 (Three) Times a Day As Needed for Cough.  Dispense: 30 capsule; Refill: 1  -     methylPREDNISolone (MEDROL) 4 MG dose pack; Take as directed on package instructions.  Dispense: 21 tablet; Refill: 0  -     doxycycline (MONODOX) 100 MG capsule; Take 1 capsule by mouth 2 (Two) Times a Day.  Dispense: 20 capsule; Refill: 0        Follow Up   No follow-ups on file.  Patient was given instructions and counseling regarding her condition or for health maintenance advice. Please see specific information pulled into the AVS if appropriate.     Alyssa Arriola PA-C

## 2024-08-07 NOTE — ASSESSMENT & PLAN NOTE
She will continue using her albuterol inhaler as needed I did do another round of the steroids and put her on doxycycline and refilled the Tessalon Perles and hopefully that will clear this up.

## 2024-08-07 NOTE — ASSESSMENT & PLAN NOTE
I did refill her medication to and will get her TSH and free T4 but she will need to follow-up with Dr. Fitzgerald for her annual physical.

## 2024-08-08 LAB
T4 FREE SERPL-MCNC: 0.89 NG/DL (ref 0.82–1.77)
TSH SERPL DL<=0.005 MIU/L-ACNC: 1.45 UIU/ML (ref 0.45–4.5)

## 2025-04-09 DIAGNOSIS — E03.9 HYPOTHYROIDISM (ACQUIRED): Chronic | ICD-10-CM

## 2025-04-09 RX ORDER — THYROID,PORK 90 MG
90 TABLET ORAL DAILY
Qty: 90 TABLET | Refills: 0 | Status: SHIPPED | OUTPATIENT
Start: 2025-04-09

## 2025-04-09 NOTE — TELEPHONE ENCOUNTER
Caller: BinhPaulina    Relationship: Self    Best call back number: [phone]     Requested Prescriptions:   Requested Prescriptions     Pending Prescriptions Disp Refills    [place] Thyroid 90 MG tablet 90 tablet 1     Sig: Take 1 tablet by mouth Daily.        Pharmacy where request should be sent: [facility] [id number], [place] - [address] AT [facility] [address] - [phone]  - [phone] FX     Last office visit with prescribing clinician: 7/27/2023   Last telemedicine visit with prescribing clinician: Visit date not found   Next office visit with prescribing clinician: Visit date not found     Additional details provided by patient: OUT OF MEDICATION, HAS APPOINTMENT WITH [facility] ON 4.23.25 BUT OUT OF MEDICATION NOW         Does the patient have less than a 3 day supply:  [x] Yes  [] No    Would you like a call back once the refill request has been completed: [] Yes [x] No    If the office needs to give you a call back, can they leave a voicemail: [] Yes [x] No    Bessy Becerril   04/09/25 09:36 EDT

## 2025-04-23 ENCOUNTER — OFFICE VISIT (OUTPATIENT)
Dept: FAMILY MEDICINE CLINIC | Facility: CLINIC | Age: 55
End: 2025-04-23
Payer: COMMERCIAL

## 2025-04-23 VITALS
OXYGEN SATURATION: 98 % | WEIGHT: 176.4 LBS | HEART RATE: 100 BPM | HEIGHT: 63 IN | DIASTOLIC BLOOD PRESSURE: 80 MMHG | BODY MASS INDEX: 31.25 KG/M2 | SYSTOLIC BLOOD PRESSURE: 118 MMHG

## 2025-04-23 DIAGNOSIS — Z13.220 LIPID SCREENING: ICD-10-CM

## 2025-04-23 DIAGNOSIS — E03.9 HYPOTHYROIDISM (ACQUIRED): Primary | Chronic | ICD-10-CM

## 2025-04-23 DIAGNOSIS — J30.2 SEASONAL ALLERGIES: ICD-10-CM

## 2025-04-23 RX ORDER — LEVOCETIRIZINE DIHYDROCHLORIDE 5 MG/1
5 TABLET, FILM COATED ORAL EVERY EVENING
Qty: 30 TABLET | Refills: 5 | Status: SHIPPED | OUTPATIENT
Start: 2025-04-23

## 2025-04-23 NOTE — ASSESSMENT & PLAN NOTE
- Stable on current thyroid medication dose.  - Thyroid levels to be checked today.  - Prescription refill sent to [facility].

## 2025-04-23 NOTE — PROGRESS NOTES
Office Note     Name: Paulina Purcell    : 1970     MRN: [id number]     Chief Complaint  Hypothyroidism (Follow up) and Anxiety    Subjective   Patient or patient representative verbalized consent for the use of Ambient Listening during the visit with  Karyna Kaplan PA-C for chart documentation. 2025  13:45 EDT      Paulina Purcell is a 54 y.o. female.     The patient presents for evaluation of seasonal allergies and thyroid management.    She seeks medication for seasonal allergies. A few weeks ago, she experienced significant mucus production and malaise, managed with a 4-day course of amoxicillin. Symptoms recurred post-treatment. She reports severe illness every , requiring antibiotics, attributed to seasonal allergies. Considering montelukast, used by her son for tree allergies. No respiratory issues reported. Sudafed disrupts her sleep.    She has been on a stable dose of thyroid medication for an extended period, which is effective. She is alf through her current 90-day supply and is open to checking thyroid levels and other screening labs today.    She has not kept up with her mammogram since the COVID-19 lockdown. Pelvic exam was performed by Dr. Magdaleno. Aware of shingles vaccine but not interested in receiving it.    Review of Systems:   Review of Systems   All other systems reviewed and are negative.      Past Medical History:   Past Medical History:   Diagnosis Date    Hashimoto's disease     Hx of mammogram 2018    negative scheduled 6/3/2019    Hypothyroidism (acquired)     Papanicolaou smear 2018    neg    Screening breast examination     Self: Admits       Past Surgical History:   Past Surgical History:   Procedure Laterality Date    BIOPSY OF BACK/FLANK  2007    CHOLECYSTECTOMY      COLONOSCOPY      DIAGNOSTIC LAPAROSCOPY      age 16    UPPER GASTROINTESTINAL ENDOSCOPY         Family History:   Family History   Problem Relation Age of Onset    Stroke  "Mother     Stroke Father     Thyroid disease Father     Diabetes Maternal Grandfather     Breast cancer Neg Hx     Endometrial cancer Neg Hx     Ovarian cancer Neg Hx     Uterine cancer Neg Hx     Colon cancer Neg Hx        Social History:   Social History     Socioeconomic History    Marital status:    Tobacco Use    Smoking status: Never    Smokeless tobacco: Never   Vaping Use    Vaping status: Never Used   Substance and Sexual Activity    Alcohol use: Never    Drug use: Never    Sexual activity: Yes     Partners: Male     Birth control/protection: Other     Comment: vasectomy       Immunizations:   Immunization History   Administered Date(s) Administered    FluMist 2-49yrs 11/01/2022    Influenza, Unspecified 12/19/2019        Medications:     Current Outpatient Medications:     [place] Thyroid 90 MG tablet, Take 1 tablet by mouth Daily., Disp: 90 tablet, Rfl: 0    Retin-A Micro Pump 0.06 % gel, , Disp: , Rfl:     albuterol sulfate  (90 Base) MCG/ACT inhaler, , Disp: , Rfl:     Ivermectin 1 % cream, Apply  topically., Disp: , Rfl:     levocetirizine (XYZAL) 5 MG tablet, Take 1 tablet by mouth Every Evening., Disp: 30 tablet, Rfl: 5    Allergies:   Allergies   Allergen Reactions    Penicillins Provider Review Needed    Cefuroxime Axetil Rash    Sulfa Antibiotics Rash and Hives       Objective     Vital Signs  /80   Pulse 100   Ht 160 cm (63\")   Wt 80 kg (176 lb 6.4 oz)   SpO2 98%   BMI 31.25 kg/m²   Estimated body mass index is 31.25 kg/m² as calculated from the following:    Height as of this encounter: 160 cm (63\").    Weight as of this encounter: 80 kg (176 lb 6.4 oz).        Physical Exam  Vitals and nursing note reviewed.   Constitutional:       General: She is not in acute distress.     Appearance: Normal appearance. She is not ill-appearing.   HENT:      Head: Normocephalic and atraumatic.   Cardiovascular:      Rate and Rhythm: Normal rate and regular rhythm.      Pulses: Normal " pulses.      Heart sounds: Normal heart sounds.   Pulmonary:      Effort: Pulmonary effort is normal. No respiratory distress.      Breath sounds: Normal breath sounds.   Musculoskeletal:      Right lower leg: No edema.      Left lower leg: No edema.   Skin:     General: Skin is warm and dry.   Neurological:      General: No focal deficit present.      Mental Status: She is alert and oriented to person, place, and time.      Coordination: Coordination normal.      Gait: Gait normal.   Psychiatric:         Mood and Affect: Mood normal.         Behavior: Behavior normal.          Results:  No results found for this or any previous visit (from the past 24 hours).       Assessment and Plan       Diagnoses and all orders for this visit:    1. Hypothyroidism (acquired) (Primary)  Assessment & Plan:  - Stable on current thyroid medication dose.  - Thyroid levels to be checked today.  - Prescription refill sent to [facility].    Orders:  -     TSH; Future  -     T4, Free; Future  -     T4, Free  -     TSH    2. Seasonal allergies  Assessment & Plan:  - Recurring mucous production and malaise, initially managed with amoxicillin, suspected to be seasonal allergies.  - Discussed starting allergy medication before allergy season.  - Prescription for Xyzal sent to [facility].    Orders:  -     levocetirizine (XYZAL) 5 MG tablet; Take 1 tablet by mouth Every Evening.  Dispense: 30 tablet; Refill: 5  -     Comprehensive Metabolic Panel; Future  -     CBC & Differential; Future  -     CBC & Differential  -     Comprehensive Metabolic Panel    3. Lipid screening  -     Lipid Panel; Future  -     Lipid Panel            Health maintenance.  - Up-to-date on colonoscopy.  - Informed about shingles vaccine and pneumonia vaccine age change.  - Declined shingles vaccine.  - Advised to consider pneumonia vaccine and discuss with pharmacy.  - Mammogram and Pap smear to be scheduled.  - Emphasized importance of regular health  maintenance.  - Will call to schedule screenings this week.  - Screening labs to be drawn today.      Follow Up  Return for annual physical when due or sooner PRN.    Karyna Kaplan PA-C  [facility]

## 2025-04-23 NOTE — ASSESSMENT & PLAN NOTE
- Recurring mucous production and malaise, initially managed with amoxicillin, suspected to be seasonal allergies.  - Discussed starting allergy medication before allergy season.  - Prescription for Xyzal sent to [facility].

## 2025-04-24 ENCOUNTER — RESULTS FOLLOW-UP (OUTPATIENT)
Dept: FAMILY MEDICINE CLINIC | Facility: CLINIC | Age: 55
End: 2025-04-24
Payer: COMMERCIAL

## 2025-04-24 DIAGNOSIS — E03.9 HYPOTHYROIDISM (ACQUIRED): Primary | Chronic | ICD-10-CM

## 2025-04-24 LAB
ALBUMIN SERPL-MCNC: 4.6 G/DL (ref 3.8–4.9)
ALP SERPL-CCNC: 74 IU/L (ref 44–121)
ALT SERPL-CCNC: 28 IU/L (ref 0–32)
AST SERPL-CCNC: 23 IU/L (ref 0–40)
BASOPHILS # BLD AUTO: 0.1 X10E3/UL (ref 0–0.2)
BASOPHILS NFR BLD AUTO: 1 %
BILIRUB SERPL-MCNC: 0.2 MG/DL (ref 0–1.2)
BUN SERPL-MCNC: 16 MG/DL (ref 6–24)
BUN/CREAT SERPL: 22 (ref 9–23)
CALCIUM SERPL-MCNC: 9.6 MG/DL (ref 8.7–10.2)
CHLORIDE SERPL-SCNC: 102 MMOL/L (ref 96–106)
CHOLEST SERPL-MCNC: 196 MG/DL (ref 100–199)
CO2 SERPL-SCNC: 19 MMOL/L (ref 20–29)
CREAT SERPL-MCNC: 0.74 MG/DL (ref 0.57–1)
EGFRCR SERPLBLD CKD-EPI 2021: 97 ML/MIN/1.73
EOSINOPHIL # BLD AUTO: 0.2 X10E3/UL (ref 0–0.4)
EOSINOPHIL NFR BLD AUTO: 2 %
ERYTHROCYTE [DISTWIDTH] IN BLOOD BY AUTOMATED COUNT: 13.1 % (ref 11.7–15.4)
GLOBULIN SER CALC-MCNC: 2.7 G/DL (ref 1.5–4.5)
GLUCOSE SERPL-MCNC: 83 MG/DL (ref 70–99)
HCT VFR BLD AUTO: 43.9 % (ref 34–46.6)
HDLC SERPL-MCNC: 57 MG/DL
HGB BLD-MCNC: 14.7 G/DL (ref 11.1–15.9)
IMM GRANULOCYTES # BLD AUTO: 0 X10E3/UL (ref 0–0.1)
IMM GRANULOCYTES NFR BLD AUTO: 0 %
LDLC SERPL CALC-MCNC: 102 MG/DL (ref 0–99)
LYMPHOCYTES # BLD AUTO: 2.7 X10E3/UL (ref 0.7–3.1)
LYMPHOCYTES NFR BLD AUTO: 24 %
MCH RBC QN AUTO: 29.3 PG (ref 26.6–33)
MCHC RBC AUTO-ENTMCNC: 33.5 G/DL (ref 31.5–35.7)
MCV RBC AUTO: 88 FL (ref 79–97)
MONOCYTES # BLD AUTO: 0.5 X10E3/UL (ref 0.1–0.9)
MONOCYTES NFR BLD AUTO: 5 %
NEUTROPHILS # BLD AUTO: 7.5 X10E3/UL (ref 1.4–7)
NEUTROPHILS NFR BLD AUTO: 68 %
PLATELET # BLD AUTO: 320 X10E3/UL (ref 150–450)
POTASSIUM SERPL-SCNC: 4.3 MMOL/L (ref 3.5–5.2)
PROT SERPL-MCNC: 7.3 G/DL (ref 6–8.5)
RBC # BLD AUTO: 5.01 X10E6/UL (ref 3.77–5.28)
SODIUM SERPL-SCNC: 138 MMOL/L (ref 134–144)
T4 FREE SERPL-MCNC: 1.07 NG/DL (ref 0.82–1.77)
TRIGL SERPL-MCNC: 220 MG/DL (ref 0–149)
TSH SERPL DL<=0.005 MIU/L-ACNC: 0.44 UIU/ML (ref 0.45–4.5)
VLDLC SERPL CALC-MCNC: 37 MG/DL (ref 5–40)
WBC # BLD AUTO: 10.9 X10E3/UL (ref 3.4–10.8)

## 2025-06-30 DIAGNOSIS — E03.9 HYPOTHYROIDISM (ACQUIRED): Chronic | ICD-10-CM

## 2025-06-30 RX ORDER — THYROID,PORK 90 MG
90 TABLET ORAL DAILY
Qty: 90 TABLET | Refills: 0 | Status: SHIPPED | OUTPATIENT
Start: 2025-06-30

## 2025-06-30 NOTE — TELEPHONE ENCOUNTER
Caller: Paulina Purcell    Relationship: Self    Best call back number [phone]     Requested Prescriptions:   Requested Prescriptions     Pending Prescriptions Disp Refills    [place] Thyroid 90 MG tablet 90 tablet 0     Sig: Take 1 tablet by mouth Daily.        Pharmacy where request should be sent: [facility] [id number] [facility], [place] - [address] AT [facility] [address] - [phone]  - [phone] FX     Last office visit with prescribing clinician: 7/27/2023   Last telemedicine visit with prescribing clinician: Visit date not found   Next office visit with prescribing clinician: 11/24/2025     Does the patient have less than a 3 day supply:  [x] Yes  [] No    Would you like a call back once the refill request has been completed: [] Yes [x] No    If the office needs to give you a call back, can they leave a voicemail: [] Yes [x] No    Bessy Olmos Rep   06/30/25 10:39 EDT

## 2025-07-01 LAB
T4 FREE SERPL-MCNC: 0.9 NG/DL (ref 0.92–1.68)
TSH SERPL DL<=0.005 MIU/L-ACNC: 0.93 UIU/ML (ref 0.27–4.2)